# Patient Record
Sex: FEMALE | Race: BLACK OR AFRICAN AMERICAN | Employment: OTHER | URBAN - METROPOLITAN AREA
[De-identification: names, ages, dates, MRNs, and addresses within clinical notes are randomized per-mention and may not be internally consistent; named-entity substitution may affect disease eponyms.]

---

## 2017-03-11 DIAGNOSIS — I10 ESSENTIAL HYPERTENSION: ICD-10-CM

## 2017-03-13 RX ORDER — VALSARTAN 80 MG/1
TABLET ORAL
Qty: 90 TAB | OUTPATIENT
Start: 2017-03-13

## 2017-03-13 NOTE — TELEPHONE ENCOUNTER
As far as I remember she told me that she was trying to find a PCP near her home address. It was just a one time visit with me for med refill at that time. Please call her and find out the status.

## 2021-09-08 ENCOUNTER — TRANSCRIBE ORDER (OUTPATIENT)
Dept: SCHEDULING | Age: 66
End: 2021-09-08

## 2021-09-08 DIAGNOSIS — Z78.0 MENOPAUSE: Primary | ICD-10-CM

## 2022-01-25 ENCOUNTER — HOSPITAL ENCOUNTER (EMERGENCY)
Age: 67
Discharge: HOME | End: 2022-01-25
Attending: STUDENT IN AN ORGANIZED HEALTH CARE EDUCATION/TRAINING PROGRAM
Payer: MEDICARE

## 2022-01-25 VITALS
HEART RATE: 72 BPM | WEIGHT: 180 LBS | TEMPERATURE: 97.3 F | OXYGEN SATURATION: 100 % | DIASTOLIC BLOOD PRESSURE: 70 MMHG | HEIGHT: 61 IN | BODY MASS INDEX: 33.99 KG/M2 | SYSTOLIC BLOOD PRESSURE: 166 MMHG | RESPIRATION RATE: 16 BRPM

## 2022-01-25 DIAGNOSIS — Z04.9 SUSPECTED CONDITION NOT FOUND: Primary | ICD-10-CM

## 2022-01-25 PROCEDURE — 99281 EMR DPT VST MAYX REQ PHY/QHP: CPT

## 2022-01-25 NOTE — ED PROVIDER NOTES
Panda Martines is a 77 y.o. female with PMhx of HTN, preDM who presents to ED with cc of concern of color change to her finger. Patient states approximately 3 days ago, she noted a \" bruise\" to her middle digit. Denies injury or pain, states it spontaneously improved. Reports that earlier today, while she was driving around 4 PM, she noted a blue color to that similar area and stated the tip of her finger appeared more white and less pink than the others next to it. Again, denies injury. She denies pain, states it felt \"funny \". Notes symptoms have improved by this time. Denies known history of Raynaud's. Denies history of similar. PMHx: Reviewed, as below. PSHx: Reviewed, as below. PCP: Leida Taylor MD    There are no other complaints verbalized at this time. Past Medical History:   Diagnosis Date    Hypertension     Prediabetes        Past Surgical History:   Procedure Laterality Date    HX BREAST LUMPECTOMY Left     HX DILATION AND CURETTAGE      HX GYN          HX TONSILLECTOMY           No family history on file. Social History     Socioeconomic History    Marital status:      Spouse name: Not on file    Number of children: Not on file    Years of education: Not on file    Highest education level: Not on file   Occupational History    Not on file   Tobacco Use    Smoking status: Never Smoker    Smokeless tobacco: Never Used   Substance and Sexual Activity    Alcohol use:  Yes     Alcohol/week: 1.0 standard drink     Types: 1 Glasses of wine per week     Comment: rarely    Drug use: Not on file    Sexual activity: Not on file   Other Topics Concern    Not on file   Social History Narrative    Not on file     Social Determinants of Health     Financial Resource Strain:     Difficulty of Paying Living Expenses: Not on file   Food Insecurity:     Worried About Running Out of Food in the Last Year: Not on file    [address] in the Last Year: Not on file   Transportation Needs:     Lack of Transportation (Medical): Not on file    Lack of Transportation (Non-Medical): Not on file   Physical Activity:     Days of Exercise per Week: Not on file    Minutes of Exercise per Session: Not on file   Stress:     Feeling of Stress : Not on file   Social Connections:     Frequency of Communication with Friends and Family: Not on file    Frequency of Social Gatherings with Friends and Family: Not on file    Attends [employer] Services: Not on file    Active Member of [address] [place] [address] [employer] or Organizations: Not on file    Attends Club or Organization Meetings: Not on file    Marital Status: Not on file   Intimate Partner Violence:     Fear of Current or Ex-Partner: Not on file    Emotionally Abused: Not on file    Physically Abused: Not on file    Sexually Abused: Not on file   Housing Stability:     Unable to Pay for Housing in the Last Year: Not on file    Number of [place] in the Last Year: Not on file    Unstable Housing in the Last Year: Not on file         ALLERGIES: Acetaminophen and Codeine    Review of Systems   Musculoskeletal: Negative for arthralgias and myalgias. Skin: Positive for color change. Negative for wound. Neurological: Negative for numbness. All other systems reviewed and are negative. Vitals:    01/25/22 1752   BP: (!) 166/70   Pulse: 72   Resp: 16   Temp: 97.3 °F (36.3 °C)   SpO2: 100%   Weight: 81.6 kg (180 lb)   Height: 5' 1\" (1.549 m)            Physical Exam  Vitals and nursing note reviewed. Constitutional:       Appearance: Normal appearance. She is not toxic-appearing or diaphoretic. HENT:      Head: Atraumatic. Right Ear: External ear normal.      Left Ear: External ear normal.   Eyes:      Conjunctiva/sclera: Conjunctivae normal.   Cardiovascular:      Rate and Rhythm: Normal rate. Pulmonary:      Effort: Pulmonary effort is normal. No respiratory distress.    Abdominal:      General: There is no distension. Musculoskeletal:         General: No swelling or deformity. Cervical back: Normal range of motion. Comments: Radial, median and ulnar nerve motor and sensory distributions intact. Full flexion and extension isolated at each joint of L middle digit. Capillary refill less than 2 seconds. 2+ radial pulse. Even coloration throughout digit. No tenderness to palpation along digit or hand. Skin:     General: Skin is warm and dry. Neurological:      Mental Status: She is alert and oriented to person, place, and time. Mental status is at baseline. MDM  Number of Diagnoses or Management Options     Amount and/or Complexity of Data Reviewed  Discuss the patient with other providers: yes (Dr Mohinder Greer, ED attending)           Procedures          VITAL SIGNS:  Vitals:    01/25/22 1752   BP: (!) 166/70   Pulse: 72   Resp: 16   Temp: 97.3 °F (36.3 °C)   SpO2: 100%   Weight: 81.6 kg (180 lb)   Height: 5' 1\" (1.549 m)         LABS:  No results found for this or any previous visit (from the past 24 hour(s)). IMAGING:  No orders to display         Medications During Visit:  Medications - No data to display      DECISION MAKING:    Quinn Owens is a 77 y.o. female who comes in as above. Presents afebrile and hemodynamically stable. Presents with concern for discoloration to her left middle finger. Reports having had 2 episodes with spontaneous resolution of the symptoms. History suggestive of possible Raynaud's. Patient has had no injury, she is neurovascularly intact distally. No evidence of reported symptoms at this time. Will have follow-up with her PCP. I have discussed care with ED attending. Pt has been given close return precautions as well as follow up recommendations. Opportunity for questions presented. Pt verbalizes their understanding and agreement with care plan. IMPRESSION:  1.  Suspected condition not found        DISPOSITION:  Discharged      Discharge Medication List as of 1/25/2022  6:13 PM           Follow-up Information     Follow up With Specialties Details Why Contact Lindley Boxer, MD Family Medicine Schedule an appointment as soon as possible for a visit   [address]      [facility] EMERGENCY DEPT Emergency Medicine Go to  As needed, If symptoms worsen [address] [facility]  [phone]            The patient is asked to follow-up with their primary care provider and any other physicians as above. We have discussed strict return precautions and the patient is asked to return promptly for any increased concerns or worsening of symptoms and for return precautions regarding their symptoms today. They can return to this emergency department or any other emergency department. I have discussed with them results as above and presented opportunity for questions. They verbalize their understanding of the above and agreement with care plan. Please note that this dictation was completed with [employer], the computer voice recognition software. Quite often unanticipated grammatical, syntax, homophones, and other interpretive errors are inadvertently transcribed by the computer software. Please disregard these errors. Please excuse any errors that have escaped final proofreading.

## 2022-01-25 NOTE — ED TRIAGE NOTES
Pt presents with her finger being blue but now is normal. Some discoloration seen inside of middle finger. No injury or pain.